# Patient Record
Sex: MALE | Race: WHITE | Employment: FULL TIME | URBAN - METROPOLITAN AREA
[De-identification: names, ages, dates, MRNs, and addresses within clinical notes are randomized per-mention and may not be internally consistent; named-entity substitution may affect disease eponyms.]

---

## 2018-09-07 ENCOUNTER — OFFICE VISIT (OUTPATIENT)
Dept: PRIMARY CARE CLINIC | Age: 53
End: 2018-09-07
Payer: COMMERCIAL

## 2018-09-07 VITALS
BODY MASS INDEX: 27.18 KG/M2 | HEIGHT: 67 IN | TEMPERATURE: 98.1 F | WEIGHT: 173.2 LBS | SYSTOLIC BLOOD PRESSURE: 120 MMHG | RESPIRATION RATE: 16 BRPM | DIASTOLIC BLOOD PRESSURE: 72 MMHG | OXYGEN SATURATION: 97 % | HEART RATE: 83 BPM

## 2018-09-07 DIAGNOSIS — M79.89 SWELLING OF RIGHT RING FINGER: ICD-10-CM

## 2018-09-07 DIAGNOSIS — L03.011 CELLULITIS OF RIGHT RING FINGER: ICD-10-CM

## 2018-09-07 DIAGNOSIS — S69.91XA INJURY OF RING FINGER, RIGHT, INITIAL ENCOUNTER: Primary | ICD-10-CM

## 2018-09-07 PROCEDURE — G8427 DOCREV CUR MEDS BY ELIG CLIN: HCPCS | Performed by: FAMILY MEDICINE

## 2018-09-07 PROCEDURE — G8419 CALC BMI OUT NRM PARAM NOF/U: HCPCS | Performed by: FAMILY MEDICINE

## 2018-09-07 PROCEDURE — 3017F COLORECTAL CA SCREEN DOC REV: CPT | Performed by: FAMILY MEDICINE

## 2018-09-07 PROCEDURE — 96372 THER/PROPH/DIAG INJ SC/IM: CPT | Performed by: FAMILY MEDICINE

## 2018-09-07 PROCEDURE — 99213 OFFICE O/P EST LOW 20 MIN: CPT | Performed by: FAMILY MEDICINE

## 2018-09-07 PROCEDURE — 1036F TOBACCO NON-USER: CPT | Performed by: FAMILY MEDICINE

## 2018-09-07 RX ORDER — CEFDINIR 300 MG/1
600 CAPSULE ORAL DAILY
Qty: 20 CAPSULE | Refills: 0 | Status: SHIPPED | OUTPATIENT
Start: 2018-09-07 | End: 2018-09-17

## 2018-09-07 RX ORDER — CEFTRIAXONE 1 G/1
1 INJECTION, POWDER, FOR SOLUTION INTRAMUSCULAR; INTRAVENOUS ONCE
Status: COMPLETED | OUTPATIENT
Start: 2018-09-07 | End: 2018-09-07

## 2018-09-07 RX ADMIN — CEFTRIAXONE 1 G: 1 INJECTION, POWDER, FOR SOLUTION INTRAMUSCULAR; INTRAVENOUS at 17:13

## 2018-09-07 NOTE — PROGRESS NOTES
Alcohol use Yes    Drug use: No    Sexual activity: Not on file     Other Topics Concern    Not on file     Social History Narrative    No narrative on file     Allergies:  Patient has no known allergies. Review of Systems   Cardiovascular: Negative for chest pain. Neurological: Negative for tingling and numbness. Objective:   /72 (Site: Left Upper Arm, Position: Sitting, Cuff Size: Large Adult)   Pulse 83   Temp 98.1 °F (36.7 °C) (Oral)   Resp 16   Ht 5' 7\" (1.702 m)   Wt 173 lb 3.2 oz (78.6 kg)   SpO2 97%   BMI 27.13 kg/m²     Physical Exam   Musculoskeletal:        Right hand: He exhibits decreased range of motion, tenderness and swelling. He exhibits no deformity. Hands:      PHYSICAL EXAMINATION:   VITAL SIGNS: are as recorded. GENERAL:  The patient appears well nourished and well-developed, and with normal affect. No acute respiratory distress. Alert and oriented times 3. No skin rashes. HEENT:  TMs normal bilaterally. Canals and ears normal. Pharynx is clear. Extraocular eye motions intact and pain free. Pupils reactive and equally round. Sclerae and conjunctivae clear, normocephalic and atraumatic. RESPIRATORY:  Clear and equal breath sounds with no acute respiratory distress. HEART: Regular rhythm without murmur, rub or gallop. ABDOMEN:  soft, nontender. No masses, guarding or rebound. Normoactive bowel sounds. NECK: No masses or adenopathy palpable. No bruits heard. No asymmetry visible. LOW BACK: No tenderness to palpation of inferior lumbar spine or either sacroiliac joint area. Finger impr  Assessment:      Diagnosis Orders   1. Injury of ring finger, right, initial encounter  cefTRIAXone (ROCEPHIN) injection 1 g    cefdinir (OMNICEF) 300 MG capsule   2. Cellulitis of right ring finger  cefTRIAXone (ROCEPHIN) injection 1 g    cefdinir (OMNICEF) 300 MG capsule   3.  Swelling of right ring finger  cefTRIAXone (ROCEPHIN) injection 1 g cefdinir (OMNICEF) 300 MG capsule       Plan:      No orders of the defined types were placed in this encounter. Orders Placed This Encounter   Medications    cefTRIAXone (ROCEPHIN) injection 1 g    cefdinir (OMNICEF) 300 MG capsule     Sig: Take 2 capsules by mouth daily for 10 days     Dispense:  20 capsule     Refill:  0     Consider xr next    Controlled Substances Monitoring:      Return in about 5 days (around 9/12/2018). I, ROMULO Rene  , am scribing for and in the presence of Jorge Avilez MD. Electronically signed by :  Chip Carlos MD, personally performed the services described in this documentation, as scribed by ROMULO Rene in my presence, and it is both accurate and complete.  Electronically signed by: Jorge Avilez MD    9/10/18 6:25 AM    Jorge Avilez MD

## 2018-09-12 ENCOUNTER — OFFICE VISIT (OUTPATIENT)
Dept: PRIMARY CARE CLINIC | Age: 53
End: 2018-09-12

## 2018-09-12 VITALS
DIASTOLIC BLOOD PRESSURE: 78 MMHG | HEART RATE: 70 BPM | BODY MASS INDEX: 27 KG/M2 | TEMPERATURE: 97.9 F | OXYGEN SATURATION: 96 % | WEIGHT: 172 LBS | RESPIRATION RATE: 14 BRPM | HEIGHT: 67 IN | SYSTOLIC BLOOD PRESSURE: 116 MMHG

## 2018-09-12 DIAGNOSIS — L03.011 CELLULITIS OF RIGHT RING FINGER: Primary | ICD-10-CM

## 2018-09-12 DIAGNOSIS — S60.454A FOREIGN BODY OF RIGHT RING FINGER: ICD-10-CM

## 2018-09-12 PROCEDURE — 99999 PR OFFICE/OUTPT VISIT,PROCEDURE ONLY: CPT | Performed by: FAMILY MEDICINE

## 2018-09-12 ASSESSMENT — PATIENT HEALTH QUESTIONNAIRE - PHQ9
2. FEELING DOWN, DEPRESSED OR HOPELESS: 0
SUM OF ALL RESPONSES TO PHQ QUESTIONS 1-9: 0
SUM OF ALL RESPONSES TO PHQ QUESTIONS 1-9: 0
1. LITTLE INTEREST OR PLEASURE IN DOING THINGS: 0
SUM OF ALL RESPONSES TO PHQ9 QUESTIONS 1 & 2: 0

## 2018-09-12 NOTE — PROGRESS NOTES
HEART: Regular rhythm without murmur, rub or gallop. ABDOMEN:  soft, nontender. No masses, guarding or rebound. Normoactive bowel sounds. NECK: No masses or adenopathy palpable. No bruits heard. No asymmetry visible. LOW BACK: No tenderness to palpation of inferior lumbar spine or either sacroiliac joint area. Finger swollen impr mildly    Assessment/Plan  James was seen today for finger injury and health maintenance. Diagnoses and all orders for this visit:    Cellulitis of right ring finger    Foreign body of right ring finger  -     XR HAND RIGHT (MIN 3 VIEWS); Future            Health Maintenance Due   Topic Date Due    HIV screen  09/20/1982    DTaP/Tdap/Td vaccine (1 - Tdap) 09/20/1986    Diabetes screen  09/20/2007    Shingles Vaccine (1 of 2 - 2 Dose Series) 09/20/2017    Colon cancer screen colonoscopy  09/20/2017    Lipid screen  06/20/2018    Flu vaccine (1) 09/01/2018     XR next     cont antibiotic     Controlled Substances Monitoring:      Return in about 1 day (around 9/13/2018).     Momo Rao MD

## 2018-09-18 ENCOUNTER — TELEPHONE (OUTPATIENT)
Dept: PRIMARY CARE CLINIC | Age: 53
End: 2018-09-18

## 2018-09-18 DIAGNOSIS — L03.011 CELLULITIS OF RIGHT RING FINGER: Primary | ICD-10-CM

## 2018-09-18 RX ORDER — LEVOFLOXACIN 500 MG/1
500 TABLET, FILM COATED ORAL DAILY
Qty: 10 TABLET | Refills: 0 | Status: SHIPPED | OUTPATIENT
Start: 2018-09-18 | End: 2018-09-28

## 2018-09-18 NOTE — TELEPHONE ENCOUNTER
Pt still has infection in finger. At first he thought it was getting better, but now its back the same. Was told to call for different antibiotic if this happened.

## 2018-12-14 ENCOUNTER — OFFICE VISIT (OUTPATIENT)
Dept: PRIMARY CARE CLINIC | Age: 53
End: 2018-12-14
Payer: COMMERCIAL

## 2018-12-14 VITALS
BODY MASS INDEX: 26.53 KG/M2 | OXYGEN SATURATION: 95 % | HEIGHT: 67 IN | HEART RATE: 85 BPM | RESPIRATION RATE: 16 BRPM | SYSTOLIC BLOOD PRESSURE: 116 MMHG | TEMPERATURE: 97.8 F | DIASTOLIC BLOOD PRESSURE: 70 MMHG | WEIGHT: 169 LBS

## 2018-12-14 DIAGNOSIS — Z12.5 PROSTATE CANCER SCREENING: ICD-10-CM

## 2018-12-14 DIAGNOSIS — Z12.11 COLON CANCER SCREENING: ICD-10-CM

## 2018-12-14 DIAGNOSIS — E78.2 MIXED HYPERLIPIDEMIA: ICD-10-CM

## 2018-12-14 DIAGNOSIS — Z00.00 ANNUAL PHYSICAL EXAM: Primary | ICD-10-CM

## 2018-12-14 LAB
ALBUMIN SERPL-MCNC: 4.7 G/DL (ref 3.9–4.9)
ALP BLD-CCNC: 68 U/L (ref 35–104)
ALT SERPL-CCNC: 21 U/L (ref 0–41)
ANION GAP SERPL CALCULATED.3IONS-SCNC: 15 MEQ/L (ref 7–13)
AST SERPL-CCNC: 24 U/L (ref 0–40)
BASOPHILS ABSOLUTE: 0 K/UL (ref 0–0.2)
BASOPHILS RELATIVE PERCENT: 0.6 %
BILIRUB SERPL-MCNC: 0.5 MG/DL (ref 0–1.2)
BILIRUBIN, POC: NORMAL
BLOOD URINE, POC: NORMAL
BUN BLDV-MCNC: 14 MG/DL (ref 6–20)
CALCIUM SERPL-MCNC: 9.2 MG/DL (ref 8.6–10.2)
CHLORIDE BLD-SCNC: 98 MEQ/L (ref 98–107)
CHOLESTEROL, TOTAL: 183 MG/DL (ref 0–199)
CLARITY, POC: CLEAR
CO2: 25 MEQ/L (ref 22–29)
COLOR, POC: YELLOW
CREAT SERPL-MCNC: 0.98 MG/DL (ref 0.7–1.2)
EOSINOPHILS ABSOLUTE: 0 K/UL (ref 0–0.7)
EOSINOPHILS RELATIVE PERCENT: 0.6 %
GFR AFRICAN AMERICAN: >60
GFR NON-AFRICAN AMERICAN: >60
GLOBULIN: 2.3 G/DL (ref 2.3–3.5)
GLUCOSE BLD-MCNC: 76 MG/DL (ref 74–109)
GLUCOSE URINE, POC: NORMAL
HCT VFR BLD CALC: 42.1 % (ref 42–52)
HDLC SERPL-MCNC: 44 MG/DL (ref 40–59)
HEMOGLOBIN: 14.4 G/DL (ref 14–18)
KETONES, POC: NORMAL
LDL CHOLESTEROL CALCULATED: 122 MG/DL (ref 0–129)
LEUKOCYTE EST, POC: NORMAL
LYMPHOCYTES ABSOLUTE: 1.1 K/UL (ref 1–4.8)
LYMPHOCYTES RELATIVE PERCENT: 13.3 %
MCH RBC QN AUTO: 32 PG (ref 27–31.3)
MCHC RBC AUTO-ENTMCNC: 34.2 % (ref 33–37)
MCV RBC AUTO: 93.5 FL (ref 80–100)
MONOCYTES ABSOLUTE: 0.4 K/UL (ref 0.2–0.8)
MONOCYTES RELATIVE PERCENT: 4.8 %
NEUTROPHILS ABSOLUTE: 6.5 K/UL (ref 1.4–6.5)
NEUTROPHILS RELATIVE PERCENT: 80.7 %
NITRITE, POC: NORMAL
PDW BLD-RTO: 12.8 % (ref 11.5–14.5)
PH, POC: 6
PLATELET # BLD: 310 K/UL (ref 130–400)
POTASSIUM SERPL-SCNC: 4.5 MEQ/L (ref 3.5–5.1)
PROSTATE SPECIFIC ANTIGEN: 0.53 NG/ML (ref 0–3.89)
PROTEIN, POC: NORMAL
RBC # BLD: 4.5 M/UL (ref 4.7–6.1)
SODIUM BLD-SCNC: 138 MEQ/L (ref 132–144)
SPECIFIC GRAVITY, POC: 1.02
TOTAL PROTEIN: 7 G/DL (ref 6.4–8.1)
TRIGL SERPL-MCNC: 83 MG/DL (ref 0–200)
UROBILINOGEN, POC: 3.5
WBC # BLD: 8.1 K/UL (ref 4.8–10.8)

## 2018-12-14 PROCEDURE — 99396 PREV VISIT EST AGE 40-64: CPT | Performed by: FAMILY MEDICINE

## 2018-12-14 PROCEDURE — 81002 URINALYSIS NONAUTO W/O SCOPE: CPT | Performed by: FAMILY MEDICINE

## 2018-12-14 PROCEDURE — G8484 FLU IMMUNIZE NO ADMIN: HCPCS | Performed by: FAMILY MEDICINE

## 2018-12-14 ASSESSMENT — ENCOUNTER SYMPTOMS
APNEA: 0
CHEST TIGHTNESS: 0
EYE DISCHARGE: 0
PHOTOPHOBIA: 0
VOMITING: 0
SHORTNESS OF BREATH: 0
BACK PAIN: 0
EYES NEGATIVE: 1
DIARRHEA: 0
ABDOMINAL PAIN: 0
BLOOD IN STOOL: 0
RESPIRATORY NEGATIVE: 1
GASTROINTESTINAL NEGATIVE: 1
NAUSEA: 0
COUGH: 0
CONSTIPATION: 0

## 2018-12-14 NOTE — PROGRESS NOTES
Subjective:      Patient ID: Barrett Light is a 48 y.o. male who presents today for:  Chief Complaint   Patient presents with    Annual Exam     pt here for wellness check, no complaints or concerns       HPI   Annual exam  Pt presents today for an annual physical exam. He is fasting today. No concerns at this time. Past Medical History:   Diagnosis Date    Allergic rhinitis     Hyperlipidemia     Hypertriglyceridemia     SEVERE     History reviewed. No pertinent surgical history. History reviewed. No pertinent family history. Social History     Social History    Marital status:      Spouse name: N/A    Number of children: N/A    Years of education: N/A     Occupational History    Not on file. Social History Main Topics    Smoking status: Never Smoker    Smokeless tobacco: Never Used    Alcohol use Yes    Drug use: No    Sexual activity: Not on file     Other Topics Concern    Not on file     Social History Narrative    No narrative on file     Allergies:  Patient has no allergy information on record. Review of Systems   Constitutional: Negative. Negative for activity change, appetite change, fatigue and fever. HENT: Negative. Negative for congestion, nosebleeds and tinnitus. Eyes: Negative. Negative for photophobia, discharge and visual disturbance. Respiratory: Negative. Negative for apnea, cough, chest tightness and shortness of breath. Cardiovascular: Negative. Negative for chest pain and palpitations. Gastrointestinal: Negative. Negative for abdominal pain, blood in stool, constipation, diarrhea, nausea and vomiting. Genitourinary: Negative. Negative for dysuria, frequency, hematuria and urgency. Musculoskeletal: Negative. Negative for back pain and neck pain. Skin: Negative. Negative for pallor. Neurological: Negative. Negative for dizziness, syncope, speech difficulty, weakness, light-headedness and headaches.    Psychiatric/Behavioral: Negative. Objective:   /70 Comment: per DC  Pulse 85   Temp 97.8 °F (36.6 °C)   Resp 16   Ht 5' 7\" (1.702 m)   Wt 169 lb (76.7 kg)   SpO2 95%   BMI 26.47 kg/m²     Physical Exam   Constitutional: He is oriented to person, place, and time. He appears well-developed and well-nourished. HENT:   Head: Normocephalic and atraumatic. Eyes: Pupils are equal, round, and reactive to light. Conjunctivae and EOM are normal.   Neck: Normal range of motion. Neck supple. No JVD present. No thyromegaly present. Cardiovascular: Normal rate, regular rhythm, normal heart sounds and intact distal pulses. Exam reveals no gallop. No murmur heard. Pulmonary/Chest: Effort normal and breath sounds normal. No respiratory distress. He has no wheezes. Abdominal: Soft. Bowel sounds are normal. He exhibits no distension. There is no tenderness. Musculoskeletal: Normal range of motion. He exhibits no edema. Lymphadenopathy:     He has no cervical adenopathy. Neurological: He is alert and oriented to person, place, and time. He has normal reflexes. No cranial nerve deficit. Skin: Skin is warm and dry. No rash noted. Psychiatric: He has a normal mood and affect. Thought content normal.   Nursing note and vitals reviewed. Assessment:      Diagnosis Orders   1. Annual physical exam  POCT Urinalysis no Micro   2. Prostate cancer screening  PSA Screening   3. Mixed hyperlipidemia  CBC With Auto Differential    Comprehensive Metabolic Panel    Lipid Panel   4. Colon cancer screening  COLOGUARD       Plan:      Orders Placed This Encounter   Procedures    COLOGUARD     This test is performed by an external laboratory and is used for result attachment only. Please fill out the appropriate paperwork required by the processing laboratory. See [email or web address] for further information.      Standing Status:   Future     Standing Expiration Date:   12/14/2019    CBC With Auto Differential     Standing Status: Future     Number of Occurrences:   1     Standing Expiration Date:   12/14/2019    Comprehensive Metabolic Panel     Standing Status:   Future     Number of Occurrences:   1     Standing Expiration Date:   12/14/2019    Lipid Panel     Standing Status:   Future     Number of Occurrences:   1     Standing Expiration Date:   12/14/2019     Order Specific Question:   Is Patient Fasting?/# of Hours     Answer:   yes    PSA Screening     Standing Status:   Future     Number of Occurrences:   1     Standing Expiration Date:   12/14/2019    POCT Urinalysis no Micro         Controlled Substances Monitoring:     Return in about 1 year (around 12/14/2019) for Routine follow up, Review of Labs & Diagnostic Testing. ROMULO Chao, CMA  , am scribing for and in the presence of Carolann Crane DO. Electronically signed by :ROMULO Caballero, [address] [place] [address], Carolann Crane DO, personally performed the services described in this documentation, as scribed by Naomi Little in my presence, and it is both accurate and complete.  Electronically signed by: Carolann Crane DO    12/18/18 10:20 PM    Carolann Crane DO

## 2018-12-19 ENCOUNTER — TELEPHONE (OUTPATIENT)
Dept: PRIMARY CARE CLINIC | Age: 53
End: 2018-12-19

## 2019-03-12 ENCOUNTER — TELEPHONE (OUTPATIENT)
Dept: PRIMARY CARE CLINIC | Age: 54
End: 2019-03-12

## 2019-03-18 DIAGNOSIS — M25.50 ARTHRALGIA, UNSPECIFIED JOINT: ICD-10-CM

## 2019-03-18 DIAGNOSIS — R53.83 FATIGUE, UNSPECIFIED TYPE: Primary | ICD-10-CM

## 2019-03-20 ENCOUNTER — TELEPHONE (OUTPATIENT)
Dept: PRIMARY CARE CLINIC | Age: 54
End: 2019-03-20

## 2019-03-22 DIAGNOSIS — M25.50 ARTHRALGIA, UNSPECIFIED JOINT: ICD-10-CM

## 2019-03-22 DIAGNOSIS — E55.9 VITAMIN D DEFICIENCY: ICD-10-CM

## 2019-03-22 DIAGNOSIS — R53.83 FATIGUE, UNSPECIFIED TYPE: ICD-10-CM

## 2019-03-22 DIAGNOSIS — Z01.83 BLOOD TYPING ENCOUNTER: Primary | ICD-10-CM

## 2019-03-22 LAB
ABO/RH: NORMAL
ANTIBODY SCREEN: NORMAL
FOLATE: 8.6 NG/ML (ref 7.3–26.1)
RHEUMATOID FACTOR: <10 IU/ML (ref 0–14)
SEDIMENTATION RATE, ERYTHROCYTE: 9 MM (ref 0–20)
VITAMIN B-12: 723 PG/ML (ref 232–1245)
VITAMIN D 25-HYDROXY: 17.6 NG/ML (ref 30–100)

## 2019-03-26 LAB
ANTINUCLEAR AB INTERPRETIVE COMMENT: NORMAL
ANTINUCLEAR ANTIBODY, HEP-2, IGG: NORMAL

## 2019-04-11 RX ORDER — ERGOCALCIFEROL 1.25 MG/1
50000 CAPSULE ORAL WEEKLY
Qty: 12 CAPSULE | Refills: 0 | Status: SHIPPED | OUTPATIENT
Start: 2019-04-11 | End: 2019-07-04